# Patient Record
Sex: MALE | Race: WHITE
[De-identification: names, ages, dates, MRNs, and addresses within clinical notes are randomized per-mention and may not be internally consistent; named-entity substitution may affect disease eponyms.]

---

## 2020-06-10 ENCOUNTER — HOSPITAL ENCOUNTER (EMERGENCY)
Dept: HOSPITAL 62 - ER | Age: 45
Discharge: HOME | End: 2020-06-10
Payer: MEDICAID

## 2020-06-10 VITALS — SYSTOLIC BLOOD PRESSURE: 170 MMHG | DIASTOLIC BLOOD PRESSURE: 98 MMHG

## 2020-06-10 DIAGNOSIS — Z79.1: ICD-10-CM

## 2020-06-10 DIAGNOSIS — I10: ICD-10-CM

## 2020-06-10 DIAGNOSIS — S61.011A: Primary | ICD-10-CM

## 2020-06-10 DIAGNOSIS — F17.210: ICD-10-CM
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
## 2020-06-10 DIAGNOSIS — Z23: ICD-10-CM

## 2020-06-10 PROCEDURE — 12001 RPR S/N/AX/GEN/TRNK 2.5CM/<: CPT

## 2020-06-10 PROCEDURE — 99282 EMERGENCY DEPT VISIT SF MDM: CPT

## 2020-06-10 PROCEDURE — 90715 TDAP VACCINE 7 YRS/> IM: CPT

## 2020-06-10 PROCEDURE — 90471 IMMUNIZATION ADMIN: CPT

## 2020-06-10 NOTE — ER DOCUMENT REPORT
ED Medical Screen (RME)





- General


Chief Complaint: Laceration


Stated Complaint: LACERATION


Time Seen by Provider: 06/10/20 19:44


Mode of Arrival: Ambulatory


Information source: Patient


Notes: 





44-year-old male presented to ED for: Laceration to his right hand.  It is about

2 cm long.  He states he caught a tube of 2 with his hand cut and the hand.  He 

states he does not know if there was a staple in the tibia for or if it was that

he preferred septic cutting.  He is alert oriented respirations regular and 

unlabored speaking in full sentences.  He states he does smoke about 13 

cigarettes a day does not drink alcohol and does not use any illicit drugs.  He 

does have high blood pressure and it is elevated at this time.  He is alert 

oriented respirations regular nonlabored speaking in full sentences.  He will be

given a tetanus shot in the triage.  He states he cut this at about 2 PM today











I have greeted and performed a rapid initial assessment of this patient.  A 

comprehensive ED assessment and evaluation of the patient, analysis of test 

results and completion of medical decision making process will be conducted by 

an additional ED providers.





- Related Data


Allergies/Adverse Reactions: 


                                        





No Known Allergies Allergy (Verified 06/10/20 19:41)


   








Home Medications: ibuprofen





Physical Exam





- Vital signs


Vitals: 





                                        











Temp


 


 98.6 F 


 


 06/10/20 19:34














Course





- Vital Signs


Vital signs: 





                                        











Temp Pulse Resp BP Pulse Ox


 


 98.6 F   87   16   176/103 H  98 


 


 06/10/20 19:39  06/10/20 19:39  06/10/20 19:39  06/10/20 19:39  06/10/20 19:39

## 2020-06-10 NOTE — ER DOCUMENT REPORT
ED General





- General


Chief Complaint: Laceration


Stated Complaint: LACERATION


Time Seen by Provider: 06/10/20 19:44


Mode of Arrival: Ambulatory


Notes: 





Patient is a 44-year-old white male with no reported past medical history 

presents to the emergency department with a chief complaint of laceration to the

right palmar thumb that occurred around 2 PM today.  The patient states that it 

was hit with a 2 x 4 causing a laceration.  He is unsure if there was any sharp 

object on the 2 x 4 that caused a laceration.  He states he is unsure of his 

last tetanus.  Denies any numbness, tingling, weakness or uncontrollable ble

eding.





- Related Data


Allergies/Adverse Reactions: 


                                        





No Known Allergies Allergy (Verified 06/10/20 19:41)


   








Home Medications: ibuprofen





Past Medical History





- General


Information source: Patient





- Social History


Smoking Status: Current Every Day Smoker


Family History: None


Patient has homicidal ideation: No





- Past Medical History


Cardiac Medical History: Reports: Hx Hypertension - no meds currently


Past Surgical History: Reports: Hx Orthopedic Surgery - Rrotator cuff, L knee





Review of Systems





- Review of Systems


Skin: Other - Laceration


-: Yes All other systems reviewed and negative





Physical Exam





- Vital signs


Vitals: 


                                        











Temp


 


 98.6 F 


 


 06/10/20 19:34














- General


General appearance: Appears well, Alert


In distress: None





- Respiratory


Respiratory status: No respiratory distress


Chest status: Nontender


Breath sounds: Normal


Chest palpation: Normal





- Cardiovascular


Rhythm: Regular


Heart sounds: Normal auscultation





- Extremities


General upper extremity: Other - Full range of motion of the right thumb with 

and without resistance.  Good capillary refill distal to the injury.





- Neurological


Neuro grossly intact: Yes


Cognition: Normal


Orientation: AAOx4





- Psychological


Associated symptoms: Normal affect, Normal mood





- Skin


Skin Color: Other - 2 cm laceration horizontal in orientation to the palmar 

surface of the proximal right thumb.  Slightly gaping but wound edges 

approximate.  No uncontrollable bleeding, or foreign body visualized.





Course





- Re-evaluation


Re-evalutation: 





06/10/20 22:10


Patient's tetanus was updated.  Laceration well approximated.  Dressing was 

applied and the thumb was karel taped to the index finger for immobilization 

purposes.  He will follow-up in 48 hours for wound recheck and reevaluation.  

Started on Keflex here.  He reports copious irrigation immediately after injury 

and application of Betadine.  Was clean appearing here.  Neurovascular intact 

status post.  Counseled him regarding the importance of outpatient follow-up and

advised he return here any ER immediately with any new, persistent or worsening 

symptoms.  He verbalized understood and agreed.





- Vital Signs


Vital signs: 


                                        











Temp Pulse Resp BP Pulse Ox


 


 98.6 F   87   16   176/103 H  98 


 


 06/10/20 19:39  06/10/20 19:39  06/10/20 19:39  06/10/20 19:39  06/10/20 19:39














Procedures





- Laceration/Wound Repair


  ** Right Thumb


Time completed: 22:08


Wound length (cm): 2


Wound's Depth, Shape: Superficial, Linear


Laceration pre-procedure: Sterile PPE donned, Betadine prep applied, Sterile 

drapes applied


Anesthetic type: 1% Lidocaine


Volume Anesthetic (mLs): 8


Wound explored: Clean


Irrigated w/ Saline (mLs): 100


Wound Repaired With: Sutures


Suture Size/Type: 4:0, Prolene


Number of Sutures: 3


Layer Closure?: No


Post-procedure wound care: Sterile dressing applied, Other - Fingers karel taped

together


Post-procedure NV exam normal: Yes


Complications: No





Discharge





- Discharge


Clinical Impression: 


Finger laceration


Qualifiers:


 Encounter type: initial encounter Finger: thumb Damage to nail status: without 

damage Foreign body presence: without foreign body Laterality: right Qualified 

Code(s): S61.011A - Laceration without foreign body of right thumb without 

damage to nail, initial encounter





Condition: Stable


Disposition: HOME, SELF-CARE


Instructions:  Laceration Care ([facility])


Additional Instructions: 


Follow-up with your regular doctor in 2 to 3 days for reevaluation.  Return here

or any ER immediately with any new, persistent or worsening symptoms.  Suture 

move in approximately 7 to 10 days.


Prescriptions: 


Cephalexin Monohydrate [Keflex 500 mg Capsule] 500 mg PO BID 10 Days #20 capsule

## 2020-11-27 ENCOUNTER — HOSPITAL ENCOUNTER (EMERGENCY)
Dept: HOSPITAL 62 - ER | Age: 45
Discharge: HOME | End: 2020-11-27
Payer: MEDICAID

## 2020-11-27 VITALS — SYSTOLIC BLOOD PRESSURE: 180 MMHG | DIASTOLIC BLOOD PRESSURE: 107 MMHG

## 2020-11-27 DIAGNOSIS — S83.91XA: Primary | ICD-10-CM

## 2020-11-27 DIAGNOSIS — F17.210: ICD-10-CM

## 2020-11-27 DIAGNOSIS — I10: ICD-10-CM

## 2020-11-27 DIAGNOSIS — M25.561: ICD-10-CM
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
## 2020-11-27 PROCEDURE — 96372 THER/PROPH/DIAG INJ SC/IM: CPT

## 2020-11-27 PROCEDURE — 99284 EMERGENCY DEPT VISIT MOD MDM: CPT

## 2020-11-27 PROCEDURE — 73564 X-RAY EXAM KNEE 4 OR MORE: CPT

## 2020-11-27 NOTE — RADIOLOGY REPORT (SQ)
EXAM DESCRIPTION:  KNEE RIGHT 4 VIEWS



IMAGES COMPLETED DATE/TIME:  11/27/2020 4:42 pm



REASON FOR STUDY:  pain with ambulating



COMPARISON:  None.



NUMBER OF VIEWS:  Four views.



TECHNIQUE:  AP, lateral, and both oblique radiographic images acquired of the right knee.



LIMITATIONS:  None.



FINDINGS:  MINERALIZATION: Normal.

BONES: No acute fracture or dislocation.  No worrisome bone lesions.

JOINT: No effusion.

SOFT TISSUES: No soft tissue swelling.  No radio-opaque foreign body.

OTHER: No other significant finding.



IMPRESSION:  1.  No acute osseous findings.



TECHNICAL DOCUMENTATION:  JOB ID:  [id number]

 [employer]- All Rights Reserved



Reading location - IP/workstation name: [facility]

## 2020-11-27 NOTE — ER DOCUMENT REPORT
ED General





- General


Chief Complaint: Knee Pain


Stated Complaint: RIGHT KNEE PAIN


Time Seen by Provider: 11/27/20 15:54


Notes: 





Patient presents to the ER for evaluation of right medial knee pain that began 

approximately 1 week ago when he was getting out of the floor.  He denies 

weakness or numbness.  He is amatory but states it caused increased pain in the 

knee.  He denies posterior calf pain.  He denies posterior thigh pain.  He 

denies shortness of breath.  The patient states he does have a history of 

hypertension but is not currently medicated.  No other injuries noted.





Nursing notes reviewed and past medical, social, and family histories reviewed 

and validated.





- Related Data


Allergies/Adverse Reactions: 


                                        





No Known Allergies Allergy (Verified 11/27/20 15:52)


   











Past Medical History





- General


Information source: Patient





- Social History


Smoking Status: Current Some Day Smoker


Cigarette use (# per day): Yes - 10


Chew tobacco use (# tins/day): No


Smoking Education Provided: Yes


Frequency of alcohol use: Occasional


Drug Abuse: None


Lives with: Alone


Family History: Reviewed & Not Pertinent





- Past Medical History


Cardiac Medical History: Reports: Hx Hypertension - no meds currently


Pulmonary Medical History: Reports: None


EENT Medical History: Reports: None


Neurological Medical History: Reports: None


Endocrine Medical History: Reports: None


Renal/ Medical History: Reports: None


Malignancy Medical History: Reports None


GI Medical History: Reports: None


Musculoskeletal Medical History: Reports None


Skin Medical History: Reports None


Psychiatric Medical History: Reports: None


Traumatic Medical History: Reports: None


Infectious Medical History: Reports: None


Past Surgical History: Reports: Hx Orthopedic Surgery - Rrotator cuff, L knee





- Immunizations


Immunizations up to date: Yes





Review of Systems





- Review of Systems


Notes: 





Constitutional: Negative for fever.


HENT: Negative for sore throat.


Eyes: Negative for visual changes.


Cardiovascular: Negative for chest pain.


Respiratory: Negative for shortness of breath.


Gastrointestinal: Negative for abdominal pain, vomiting or diarrhea.


Genitourinary: Negative for dysuria.


Musculoskeletal: Positive for right knee pain.


Skin: Negative for rash.


Neurological: Negative for headaches, weakness or numbness.





10 point ROS negative except as marked above and in HPI.





Physical Exam





- Vital signs


Vitals: 


                                        











Temp Pulse Resp BP Pulse Ox


 


 98.1 F   84   16   180/107 H  100 


 


 11/27/20 14:57  11/27/20 14:57  11/27/20 14:57  11/27/20 14:57  11/27/20 14:57














- Notes


Notes: 








CONSTITUTIONAL: Well appearing in no acute distress


SKIN: Warm, dry, and intact without rash


EYES: Extraocular movements are grossly intact, clear conjunctiva


HENT: Normocephalic, atraumatic, moist mucus membranes


NECK: No obvious swelling, normal range of motion


PULMONARY: Normal chest rise and fall, no respiratory distress or stridor


CARDIOVASCULAR: Regular rate, distal extremities are warm and well perfused


NEUROLOGIC: Normal speech, moves all extremities


MUSCULOSKELETAL: There is tenderness palpation of the medial aspect of the right

knee.  There is good range of motion.  Good strength versus resistance in the 

right extremity.  No obvious erythema or swelling/induration noted.


PSYCHIATRIC: Normal mood and affect





Course





- Re-evaluation


Re-evalutation: 





11/27/20 17:24





Rechecked patient.  Discussed with patient:  results, diagnosis, treatment plan,

and need for follow-up.  Patient was advised to obtain primary care to begin 

controlling his hypertension again.  Return to the emergency department warnings

were given.  All questions and concerns were addressed.  The plan is agreed with

and understood.  Patient is stable and ready for discharge.











- Vital Signs


Vital signs: 


                                        











Temp Pulse Resp BP Pulse Ox


 


 98.1 F   84   16   180/107 H  100 


 


 11/27/20 14:57  11/27/20 14:57  11/27/20 14:57  11/27/20 14:57  11/27/20 14:57














- Diagnostic Test


Radiology reviewed: Reports reviewed





Procedures





- Immobilization


  ** Right Knee


Time completed: 17:27


Pre-Proc Neuro Vasc Exam: Normal


Immobilizer type: Ace wrap


Performed by: RN


Post-Proc Neuro Vasc Exam: Normal





Discharge





- Discharge


Clinical Impression: 


Right knee sprain


Qualifiers:


 Encounter type: initial encounter Involved ligament of knee: unspecified 

ligament Qualified Code(s): S83.91XA - Sprain of unspecified site of right knee,

initial encounter





Hypertension


Qualifiers:


 Hypertension type: essential hypertension Qualified Code(s): I10 - Essential 

(primary) hypertension





Disposition: HOME, SELF-CARE


Instructions:  Sprained Knee ([facility])


Prescriptions: 


Ketorolac Tromethamine [Toradol 10 mg Tablet] 10 mg PO Q8HP PRN #12 tablet


 PRN Reason: For Pain